# Patient Record
Sex: MALE | NOT HISPANIC OR LATINO | URBAN - METROPOLITAN AREA
[De-identification: names, ages, dates, MRNs, and addresses within clinical notes are randomized per-mention and may not be internally consistent; named-entity substitution may affect disease eponyms.]

---

## 2018-09-11 ENCOUNTER — INPATIENT (INPATIENT)
Facility: HOSPITAL | Age: 48
LOS: 0 days | Discharge: HOME | DRG: 389 | End: 2018-09-12
Attending: SURGERY | Admitting: SURGERY
Payer: MEDICAID

## 2018-09-11 VITALS
RESPIRATION RATE: 16 BRPM | HEIGHT: 65 IN | OXYGEN SATURATION: 97 % | HEART RATE: 74 BPM | WEIGHT: 134.92 LBS | SYSTOLIC BLOOD PRESSURE: 128 MMHG | DIASTOLIC BLOOD PRESSURE: 85 MMHG | TEMPERATURE: 98 F

## 2018-09-11 DIAGNOSIS — K56.609 UNSPECIFIED INTESTINAL OBSTRUCTION, UNSPECIFIED AS TO PARTIAL VERSUS COMPLETE OBSTRUCTION: ICD-10-CM

## 2018-09-11 LAB
ALBUMIN SERPL ELPH-MCNC: 3.5 G/DL — SIGNIFICANT CHANGE UP (ref 3.5–5)
ALP SERPL-CCNC: 104 U/L — SIGNIFICANT CHANGE UP (ref 40–120)
ALT FLD-CCNC: 62 U/L DA — HIGH (ref 10–60)
ANION GAP SERPL CALC-SCNC: 9 MMOL/L — SIGNIFICANT CHANGE UP (ref 5–17)
AST SERPL-CCNC: 61 U/L — HIGH (ref 10–40)
BASOPHILS # BLD AUTO: 0.1 K/UL — SIGNIFICANT CHANGE UP (ref 0–0.2)
BASOPHILS NFR BLD AUTO: 0.9 % — SIGNIFICANT CHANGE UP (ref 0–2)
BILIRUB SERPL-MCNC: 0.9 MG/DL — SIGNIFICANT CHANGE UP (ref 0.2–1.2)
BUN SERPL-MCNC: 24 MG/DL — HIGH (ref 7–18)
CALCIUM SERPL-MCNC: 8 MG/DL — LOW (ref 8.4–10.5)
CHLORIDE SERPL-SCNC: 98 MMOL/L — SIGNIFICANT CHANGE UP (ref 96–108)
CO2 SERPL-SCNC: 26 MMOL/L — SIGNIFICANT CHANGE UP (ref 22–31)
CREAT SERPL-MCNC: 0.82 MG/DL — SIGNIFICANT CHANGE UP (ref 0.5–1.3)
EOSINOPHIL # BLD AUTO: 0.1 K/UL — SIGNIFICANT CHANGE UP (ref 0–0.5)
EOSINOPHIL NFR BLD AUTO: 1.3 % — SIGNIFICANT CHANGE UP (ref 0–6)
GLUCOSE SERPL-MCNC: 138 MG/DL — HIGH (ref 70–99)
HCT VFR BLD CALC: 39.7 % — SIGNIFICANT CHANGE UP (ref 39–50)
HGB BLD-MCNC: 13.3 G/DL — SIGNIFICANT CHANGE UP (ref 13–17)
LIDOCAIN IGE QN: 803 U/L — HIGH (ref 73–393)
LYMPHOCYTES # BLD AUTO: 1 K/UL — SIGNIFICANT CHANGE UP (ref 1–3.3)
LYMPHOCYTES # BLD AUTO: 12.8 % — LOW (ref 13–44)
MCHC RBC-ENTMCNC: 31.8 PG — SIGNIFICANT CHANGE UP (ref 27–34)
MCHC RBC-ENTMCNC: 33.4 GM/DL — SIGNIFICANT CHANGE UP (ref 32–36)
MCV RBC AUTO: 95.2 FL — SIGNIFICANT CHANGE UP (ref 80–100)
MONOCYTES # BLD AUTO: 0.5 K/UL — SIGNIFICANT CHANGE UP (ref 0–0.9)
MONOCYTES NFR BLD AUTO: 7 % — SIGNIFICANT CHANGE UP (ref 2–14)
NEUTROPHILS # BLD AUTO: 6.1 K/UL — SIGNIFICANT CHANGE UP (ref 1.8–7.4)
NEUTROPHILS NFR BLD AUTO: 77.9 % — HIGH (ref 43–77)
PLATELET # BLD AUTO: 130 K/UL — LOW (ref 150–400)
POTASSIUM SERPL-MCNC: 3.3 MMOL/L — LOW (ref 3.5–5.3)
POTASSIUM SERPL-SCNC: 3.3 MMOL/L — LOW (ref 3.5–5.3)
PROT SERPL-MCNC: 7.1 G/DL — SIGNIFICANT CHANGE UP (ref 6–8.3)
RBC # BLD: 4.16 M/UL — LOW (ref 4.2–5.8)
RBC # FLD: 12.3 % — SIGNIFICANT CHANGE UP (ref 10.3–14.5)
SODIUM SERPL-SCNC: 133 MMOL/L — LOW (ref 135–145)
WBC # BLD: 7.8 K/UL — SIGNIFICANT CHANGE UP (ref 3.8–10.5)
WBC # FLD AUTO: 7.8 K/UL — SIGNIFICANT CHANGE UP (ref 3.8–10.5)

## 2018-09-11 PROCEDURE — 99285 EMERGENCY DEPT VISIT HI MDM: CPT | Mod: 25

## 2018-09-11 PROCEDURE — 99222 1ST HOSP IP/OBS MODERATE 55: CPT | Mod: AI

## 2018-09-11 PROCEDURE — 74177 CT ABD & PELVIS W/CONTRAST: CPT | Mod: 26

## 2018-09-11 PROCEDURE — 71045 X-RAY EXAM CHEST 1 VIEW: CPT | Mod: 26

## 2018-09-11 RX ORDER — NICOTINE POLACRILEX 2 MG
1 GUM BUCCAL DAILY
Qty: 0 | Refills: 0 | Status: DISCONTINUED | OUTPATIENT
Start: 2018-09-11 | End: 2018-09-12

## 2018-09-11 RX ORDER — ONDANSETRON 8 MG/1
4 TABLET, FILM COATED ORAL ONCE
Qty: 0 | Refills: 0 | Status: COMPLETED | OUTPATIENT
Start: 2018-09-11 | End: 2018-09-11

## 2018-09-11 RX ORDER — BENZOCAINE AND MENTHOL 5; 1 G/100ML; G/100ML
1 LIQUID ORAL EVERY 4 HOURS
Qty: 0 | Refills: 0 | Status: DISCONTINUED | OUTPATIENT
Start: 2018-09-11 | End: 2018-09-12

## 2018-09-11 RX ORDER — MORPHINE SULFATE 50 MG/1
2 CAPSULE, EXTENDED RELEASE ORAL EVERY 4 HOURS
Qty: 0 | Refills: 0 | Status: DISCONTINUED | OUTPATIENT
Start: 2018-09-11 | End: 2018-09-12

## 2018-09-11 RX ORDER — SODIUM CHLORIDE 9 MG/ML
1000 INJECTION INTRAMUSCULAR; INTRAVENOUS; SUBCUTANEOUS ONCE
Qty: 0 | Refills: 0 | Status: COMPLETED | OUTPATIENT
Start: 2018-09-11 | End: 2018-09-11

## 2018-09-11 RX ORDER — DEXTROSE MONOHYDRATE, SODIUM CHLORIDE, AND POTASSIUM CHLORIDE 50; .745; 4.5 G/1000ML; G/1000ML; G/1000ML
1000 INJECTION, SOLUTION INTRAVENOUS
Qty: 0 | Refills: 0 | Status: DISCONTINUED | OUTPATIENT
Start: 2018-09-11 | End: 2018-09-12

## 2018-09-11 RX ORDER — SODIUM CHLORIDE 9 MG/ML
3 INJECTION INTRAMUSCULAR; INTRAVENOUS; SUBCUTANEOUS ONCE
Qty: 0 | Refills: 0 | Status: COMPLETED | OUTPATIENT
Start: 2018-09-11 | End: 2018-09-11

## 2018-09-11 RX ORDER — MORPHINE SULFATE 50 MG/1
4 CAPSULE, EXTENDED RELEASE ORAL ONCE
Qty: 0 | Refills: 0 | Status: DISCONTINUED | OUTPATIENT
Start: 2018-09-11 | End: 2018-09-11

## 2018-09-11 RX ORDER — MORPHINE SULFATE 50 MG/1
4 CAPSULE, EXTENDED RELEASE ORAL EVERY 4 HOURS
Qty: 0 | Refills: 0 | Status: DISCONTINUED | OUTPATIENT
Start: 2018-09-11 | End: 2018-09-12

## 2018-09-11 RX ORDER — HEPARIN SODIUM 5000 [USP'U]/ML
5000 INJECTION INTRAVENOUS; SUBCUTANEOUS EVERY 8 HOURS
Qty: 0 | Refills: 0 | Status: DISCONTINUED | OUTPATIENT
Start: 2018-09-11 | End: 2018-09-12

## 2018-09-11 RX ORDER — SODIUM CHLORIDE 9 MG/ML
1000 INJECTION, SOLUTION INTRAVENOUS
Qty: 0 | Refills: 0 | Status: DISCONTINUED | OUTPATIENT
Start: 2018-09-11 | End: 2018-09-12

## 2018-09-11 RX ORDER — KETOROLAC TROMETHAMINE 30 MG/ML
30 SYRINGE (ML) INJECTION EVERY 6 HOURS
Qty: 0 | Refills: 0 | Status: DISCONTINUED | OUTPATIENT
Start: 2018-09-11 | End: 2018-09-12

## 2018-09-11 RX ORDER — ONDANSETRON 8 MG/1
4 TABLET, FILM COATED ORAL EVERY 6 HOURS
Qty: 0 | Refills: 0 | Status: DISCONTINUED | OUTPATIENT
Start: 2018-09-11 | End: 2018-09-12

## 2018-09-11 RX ADMIN — MORPHINE SULFATE 4 MILLIGRAM(S): 50 CAPSULE, EXTENDED RELEASE ORAL at 10:13

## 2018-09-11 RX ADMIN — MORPHINE SULFATE 4 MILLIGRAM(S): 50 CAPSULE, EXTENDED RELEASE ORAL at 05:31

## 2018-09-11 RX ADMIN — HEPARIN SODIUM 5000 UNIT(S): 5000 INJECTION INTRAVENOUS; SUBCUTANEOUS at 13:18

## 2018-09-11 RX ADMIN — ONDANSETRON 4 MILLIGRAM(S): 8 TABLET, FILM COATED ORAL at 02:12

## 2018-09-11 RX ADMIN — MORPHINE SULFATE 4 MILLIGRAM(S): 50 CAPSULE, EXTENDED RELEASE ORAL at 09:57

## 2018-09-11 RX ADMIN — BENZOCAINE AND MENTHOL 1 LOZENGE: 5; 1 LIQUID ORAL at 11:55

## 2018-09-11 RX ADMIN — MORPHINE SULFATE 2 MILLIGRAM(S): 50 CAPSULE, EXTENDED RELEASE ORAL at 13:16

## 2018-09-11 RX ADMIN — ONDANSETRON 4 MILLIGRAM(S): 8 TABLET, FILM COATED ORAL at 09:37

## 2018-09-11 RX ADMIN — MORPHINE SULFATE 4 MILLIGRAM(S): 50 CAPSULE, EXTENDED RELEASE ORAL at 06:52

## 2018-09-11 RX ADMIN — MORPHINE SULFATE 4 MILLIGRAM(S): 50 CAPSULE, EXTENDED RELEASE ORAL at 02:31

## 2018-09-11 RX ADMIN — DEXTROSE MONOHYDRATE, SODIUM CHLORIDE, AND POTASSIUM CHLORIDE 125 MILLILITER(S): 50; .745; 4.5 INJECTION, SOLUTION INTRAVENOUS at 09:38

## 2018-09-11 RX ADMIN — Medication 1 PATCH: at 11:55

## 2018-09-11 RX ADMIN — Medication 30 MILLIGRAM(S): at 16:45

## 2018-09-11 RX ADMIN — SODIUM CHLORIDE 1000 MILLILITER(S): 9 INJECTION INTRAMUSCULAR; INTRAVENOUS; SUBCUTANEOUS at 02:03

## 2018-09-11 RX ADMIN — SODIUM CHLORIDE 1000 MILLILITER(S): 9 INJECTION INTRAMUSCULAR; INTRAVENOUS; SUBCUTANEOUS at 03:39

## 2018-09-11 RX ADMIN — MORPHINE SULFATE 4 MILLIGRAM(S): 50 CAPSULE, EXTENDED RELEASE ORAL at 03:39

## 2018-09-11 RX ADMIN — Medication 30 MILLIGRAM(S): at 22:58

## 2018-09-11 RX ADMIN — Medication 30 MILLIGRAM(S): at 22:47

## 2018-09-11 RX ADMIN — Medication 30 MILLIGRAM(S): at 16:32

## 2018-09-11 RX ADMIN — MORPHINE SULFATE 2 MILLIGRAM(S): 50 CAPSULE, EXTENDED RELEASE ORAL at 13:31

## 2018-09-11 RX ADMIN — SODIUM CHLORIDE 150 MILLILITER(S): 9 INJECTION, SOLUTION INTRAVENOUS at 11:54

## 2018-09-11 RX ADMIN — SODIUM CHLORIDE 3 MILLILITER(S): 9 INJECTION INTRAMUSCULAR; INTRAVENOUS; SUBCUTANEOUS at 02:03

## 2018-09-11 NOTE — ED ADULT NURSE NOTE - OBJECTIVE STATEMENT
48 years old male presents to ED c/o persistent abdominal pains and nausea from last night. P/S 9-10/10. Patient stated he has history of Pancreatitis. ED physician is aware patient.

## 2018-09-11 NOTE — ED PROVIDER NOTE - OBJECTIVE STATEMENT
49 y/o w/ PMHx of alcohol abuse and pancreatitis presents to ED w/ c/o upper abd pain. Pt reports that pain onset tonight after having a large dinner. Pt describes having similar mild epigastric pain 2 days ago, that had resolved on its own. After dinner tonight, pt states pain worsened, associated w/ nausea. Denies any vomiting, fever, diarrhea, or any other complaints. Notes that symptoms are similar to the time he was diagnosed w/ necrotizing pancreatitis at [facility], which was related to EtOH abuse.

## 2018-09-11 NOTE — ED PROVIDER NOTE - MEDICAL DECISION MAKING DETAILS
49 y/o w/ PMHx of EtOH related pancreatitis, w/ c/o mid epigastric abd pain. Will check labs, CT-scan of abd, given morphine for pain, IVF and Zofran.

## 2018-09-11 NOTE — ED ADULT TRIAGE NOTE - CHIEF COMPLAINT QUOTE
c/o diffused abdominal pain since 9 pm, + nausea, denies any vomiting, no diarrhea.  Pt states he was diagnosed with acute pancreatitis a year ago & he has the same symptoms as tonight. Pt took Tylenol # 3 & ASA 45 mins prior to coming to ED

## 2018-09-11 NOTE — ED ADULT NURSE NOTE - ED STAT RN HANDOFF DETAILS
handover report given to Nurse Shraddha. Patient awaiting Thomson RN accept. NG tube on low suction.

## 2018-09-11 NOTE — ED ADULT NURSE NOTE - NSIMPLEMENTINTERV_GEN_ALL_ED
Implemented All Universal Safety Interventions:  [place] to call system. Call bell, personal items and telephone within reach. Instruct patient to call for assistance. Room bathroom lighting operational. Non-slip footwear when patient is off stretcher. Physically safe environment: no spills, clutter or unnecessary equipment. Stretcher in lowest position, wheels locked, appropriate side rails in place.

## 2018-09-11 NOTE — ED PROVIDER NOTE - CARE PLAN
Principal Discharge DX:	Small bowel obstruction  Secondary Diagnosis:	Abdominal mass, left upper quadrant

## 2018-09-11 NOTE — H&P ADULT - ATTENDING COMMENTS
Agree with above  Patient has a history of significant alcohol abuse - binge drinking, with several episodes of pancreatitis including necrotizing pancreatis, for which he was hospitalized at [facility] last year. He is known to have a pancreatic mass which last was imaged about a year ago. He never saw a surgeon for this.  He now comes in with abd pain, upper abdomen, with vomiting.  Abd is nondistended, tender in upper abdomen  NG in place - placed in ER, confirmed by xray  CT reviewed - 6cm mass in distal pancreas vs stomach wall, ?malignancy  Heavy etoh use and tobacco use    - nicotine patch  - NPO  - NG tube  - MRI pancreas protocol  - GI consult  - tumor markers  - cepacol for throat pain  - banana bag  - social work consult

## 2018-09-11 NOTE — H&P ADULT - NSHPPHYSICALEXAM_GEN_ALL_CORE
NAD  alert, oriented x3  NGT in place  S1S2  good air entry b/l  abd softly distended, NT at this time  ext no c/c/e

## 2018-09-11 NOTE — H&P ADULT - HISTORY OF PRESENT ILLNESS
49 y/o male denies sig PMH or PSH  was diagnosed with abd mass about 1 year ago at [facility] per pt  he was having abd pain at that time  states he followed up with a surgeon but had followed mass with serial imaging  pt admits to binge drinking about once a month; last episode was Friday    comes to ED with c/o abd pain and attempt at self-induced vomiting  hasn't had bowel function in several days  no f/c    < from: CT Abdomen and Pelvis w/ IV Cont (09.11.18 @ 04:18) >  Lower Thorax: No consolidation or effusion.        Liver: No suspicious lesions. Hepatic steatosis.  Biliary: No dilatation. No calcified gallstones identified.  Spleen: No suspicious lesions.      Pancreas: No inflammatory changes or ductal dilatation. There is a   complex, predominantly low-attenuation multilobulated left upper quadrant   mass measuring 6 x 5.2 x 5.6 cm (craniocaudal by transverse by AP   dimension) at the level of the tail of pancreas extending to posterior   body of the gastric region. Source indeterminate, possibility of   pancreatic or gastric malignancy considered. Correlate with prior   studies, consider MR evaluation provided no MR contraindications. Upper   endoscopy also recommended for further gastric evaluation.  Adrenals: Normal.      Kidneys: No hydronephrosis or solid mass.   Vessels: Normal aortic caliber. MIld atherosclerotic disease of the aorta   and its branches. Multiple collateral vessels noted in the left upper   quadrant.    GI tract: Multiple dilated fluid-filled small bowel loops with segment of   jejunal fold apposing each other in midabdomen with suggestion of at   least two transitional points (image 36 of series 602) in right side of   abdomen and (images 79-81 of series 2) in left side of abdomen. The   findings are nonspecific, possibility of closed loop obstruction or   internal hernia considered in the differential. Surgical consultation and   short-term imaging follow-up is advised. No mesenteric edema or interloop   fluid to suggest ischemia at this time. Correlate with lactic acid   levels. Fecalization of the involved small bowel loops also identified.   Mild diverticulosis. Normal appendix.    Peritoneum/retroperitoneum and mesentery: No free air. No organized fluid   collection. No adenopathy.        Pelvic organs/Bladder: Heterogeneous prostate containing central   calcifications. Bladder is normal.        Abdominal wall: Unremarkable.  Bones and soft tissues: Mild multilevel degenerative changes of the spine   are noted.        IMPRESSION:    Multiple dilated fluid-filled small bowel loops with segment of jejunal   fold apposing each other in midabdomen with suggestion of at least two   transitional points as described. The findings are nonspecific,   possibility of closed loop obstruction or internal hernia considered in   the differential. Surgical consultation and short-term imaging follow-up   is advised. No mesenteric edema or interloop fluid to suggest ischemia at   this time. Correlate with lactic acid levels. No peripancreatic stranding   identified to suggest acute pancreatitis at this time.    Complex, predominantly low-attenuation multilobulated left upper quadrant   mass measuring 6 x 5.2 x 5.6 cm (craniocaudal by transverse by AP   dimension) at the level of the tail of pancreas extending to to posterior   body of the gastric region. Source indeterminate, possibility of   pancreatic or gastric malignancy considered. Correlate with prior   studies, consider MR evaluation provided no MR contraindications. Upper   endoscopy also recommended for further gastric evaluation.    < end of copied text > 49 y/o male denies sig PMH or PSH  was diagnosed with abd mass about 1 year ago at [facility] per pt  he was having abd pain at that time  states he followed up with a surgeon but had followed mass with serial imaging  pt admits to binge drinking about once a month; last episode was Friday. He also smokes.    comes to ED with c/o abd pain and attempt at self-induced vomiting  hasn't had bowel function in several days  no f/c    < from: CT Abdomen and Pelvis w/ IV Cont (09.11.18 @ 04:18) >  Lower Thorax: No consolidation or effusion.        Liver: No suspicious lesions. Hepatic steatosis.  Biliary: No dilatation. No calcified gallstones identified.  Spleen: No suspicious lesions.      Pancreas: No inflammatory changes or ductal dilatation. There is a   complex, predominantly low-attenuation multilobulated left upper quadrant   mass measuring 6 x 5.2 x 5.6 cm (craniocaudal by transverse by AP   dimension) at the level of the tail of pancreas extending to posterior   body of the gastric region. Source indeterminate, possibility of   pancreatic or gastric malignancy considered. Correlate with prior   studies, consider MR evaluation provided no MR contraindications. Upper   endoscopy also recommended for further gastric evaluation.  Adrenals: Normal.      Kidneys: No hydronephrosis or solid mass.   Vessels: Normal aortic caliber. MIld atherosclerotic disease of the aorta   and its branches. Multiple collateral vessels noted in the left upper   quadrant.    GI tract: Multiple dilated fluid-filled small bowel loops with segment of   jejunal fold apposing each other in midabdomen with suggestion of at   least two transitional points (image 36 of series 602) in right side of   abdomen and (images 79-81 of series 2) in left side of abdomen. The   findings are nonspecific, possibility of closed loop obstruction or   internal hernia considered in the differential. Surgical consultation and   short-term imaging follow-up is advised. No mesenteric edema or interloop   fluid to suggest ischemia at this time. Correlate with lactic acid   levels. Fecalization of the involved small bowel loops also identified.   Mild diverticulosis. Normal appendix.    Peritoneum/retroperitoneum and mesentery: No free air. No organized fluid   collection. No adenopathy.        Pelvic organs/Bladder: Heterogeneous prostate containing central   calcifications. Bladder is normal.        Abdominal wall: Unremarkable.  Bones and soft tissues: Mild multilevel degenerative changes of the spine   are noted.        IMPRESSION:    Multiple dilated fluid-filled small bowel loops with segment of jejunal   fold apposing each other in midabdomen with suggestion of at least two   transitional points as described. The findings are nonspecific,   possibility of closed loop obstruction or internal hernia considered in   the differential. Surgical consultation and short-term imaging follow-up   is advised. No mesenteric edema or interloop fluid to suggest ischemia at   this time. Correlate with lactic acid levels. No peripancreatic stranding   identified to suggest acute pancreatitis at this time.    Complex, predominantly low-attenuation multilobulated left upper quadrant   mass measuring 6 x 5.2 x 5.6 cm (craniocaudal by transverse by AP   dimension) at the level of the tail of pancreas extending to to posterior   body of the gastric region. Source indeterminate, possibility of   pancreatic or gastric malignancy considered. Correlate with prior   studies, consider MR evaluation provided no MR contraindications. Upper   endoscopy also recommended for further gastric evaluation.    < end of copied text >

## 2018-09-11 NOTE — ED PROVIDER NOTE - PROGRESS NOTE DETAILS
labs show lipase 803, CT shows LUQ mass [id number] and multiple dilated small bowel loops concerning for closed loop obstruction vs internal hernia  Discussed above with patient who reports mass is known to him. patient agrees to NGtube placement.   Dr. Man from surgical service consulted for admission, recommends admission under Dr. Nieves.

## 2018-09-12 VITALS
HEART RATE: 94 BPM | TEMPERATURE: 98 F | DIASTOLIC BLOOD PRESSURE: 87 MMHG | OXYGEN SATURATION: 99 % | SYSTOLIC BLOOD PRESSURE: 134 MMHG | RESPIRATION RATE: 16 BRPM

## 2018-09-12 DIAGNOSIS — E87.6 HYPOKALEMIA: ICD-10-CM

## 2018-09-12 DIAGNOSIS — K85.91 ACUTE PANCREATITIS WITH UNINFECTED NECROSIS, UNSPECIFIED: ICD-10-CM

## 2018-09-12 LAB
ALBUMIN SERPL ELPH-MCNC: 3.1 G/DL — LOW (ref 3.5–5)
ALP SERPL-CCNC: 89 U/L — SIGNIFICANT CHANGE UP (ref 40–120)
ALT FLD-CCNC: 44 U/L DA — SIGNIFICANT CHANGE UP (ref 10–60)
ANION GAP SERPL CALC-SCNC: 9 MMOL/L — SIGNIFICANT CHANGE UP (ref 5–17)
AST SERPL-CCNC: 31 U/L — SIGNIFICANT CHANGE UP (ref 10–40)
BILIRUB SERPL-MCNC: 0.8 MG/DL — SIGNIFICANT CHANGE UP (ref 0.2–1.2)
BUN SERPL-MCNC: 7 MG/DL — SIGNIFICANT CHANGE UP (ref 7–18)
CALCIUM SERPL-MCNC: 8.4 MG/DL — SIGNIFICANT CHANGE UP (ref 8.4–10.5)
CANCER AG125 SERPL-ACNC: 10 U/ML — SIGNIFICANT CHANGE UP
CANCER AG19-9 SERPL-ACNC: 38.4 U/ML — SIGNIFICANT CHANGE UP
CEA SERPL-MCNC: 4.2 NG/ML — HIGH (ref 0–3.8)
CHLORIDE SERPL-SCNC: 104 MMOL/L — SIGNIFICANT CHANGE UP (ref 96–108)
CO2 SERPL-SCNC: 25 MMOL/L — SIGNIFICANT CHANGE UP (ref 22–31)
CREAT SERPL-MCNC: 0.61 MG/DL — SIGNIFICANT CHANGE UP (ref 0.5–1.3)
GLUCOSE SERPL-MCNC: 123 MG/DL — HIGH (ref 70–99)
HCT VFR BLD CALC: 37.9 % — LOW (ref 39–50)
HGB BLD-MCNC: 12.7 G/DL — LOW (ref 13–17)
MCHC RBC-ENTMCNC: 32.1 PG — SIGNIFICANT CHANGE UP (ref 27–34)
MCHC RBC-ENTMCNC: 33.5 GM/DL — SIGNIFICANT CHANGE UP (ref 32–36)
MCV RBC AUTO: 95.7 FL — SIGNIFICANT CHANGE UP (ref 80–100)
PLATELET # BLD AUTO: 106 K/UL — LOW (ref 150–400)
POTASSIUM SERPL-MCNC: 3.2 MMOL/L — LOW (ref 3.5–5.3)
POTASSIUM SERPL-SCNC: 3.2 MMOL/L — LOW (ref 3.5–5.3)
PROT SERPL-MCNC: 6.5 G/DL — SIGNIFICANT CHANGE UP (ref 6–8.3)
RBC # BLD: 3.96 M/UL — LOW (ref 4.2–5.8)
RBC # FLD: 12.8 % — SIGNIFICANT CHANGE UP (ref 10.3–14.5)
SODIUM SERPL-SCNC: 138 MMOL/L — SIGNIFICANT CHANGE UP (ref 135–145)
WBC # BLD: 6.2 K/UL — SIGNIFICANT CHANGE UP (ref 3.8–10.5)
WBC # FLD AUTO: 6.2 K/UL — SIGNIFICANT CHANGE UP (ref 3.8–10.5)

## 2018-09-12 PROCEDURE — 99232 SBSQ HOSP IP/OBS MODERATE 35: CPT

## 2018-09-12 PROCEDURE — 99254 IP/OBS CNSLTJ NEW/EST MOD 60: CPT

## 2018-09-12 RX ORDER — POTASSIUM CHLORIDE 20 MEQ
40 PACKET (EA) ORAL ONCE
Qty: 0 | Refills: 0 | Status: COMPLETED | OUTPATIENT
Start: 2018-09-12 | End: 2018-09-12

## 2018-09-12 RX ADMIN — HEPARIN SODIUM 5000 UNIT(S): 5000 INJECTION INTRAVENOUS; SUBCUTANEOUS at 15:18

## 2018-09-12 RX ADMIN — Medication 1 PATCH: at 12:10

## 2018-09-12 RX ADMIN — Medication 40 MILLIEQUIVALENT(S): at 12:09

## 2018-09-12 NOTE — DISCHARGE NOTE ADULT - CARE PROVIDER_API CALL
Brennan Rojas), Gastroenterology; Internal Medicine  [address] [place] [id number]  Phone: [phone]  Fax: [phone]

## 2018-09-12 NOTE — DISCHARGE NOTE ADULT - CARE PLAN
Principal Discharge DX:	Small bowel obstruction  Goal:	Now resolved  Assessment and plan of treatment:	Diet and activity as tolerated  Secondary Diagnosis:	Abdominal mass, left upper quadrant  Goal:	Please follow up with Dr. Kong for EUS and biopsy

## 2018-09-12 NOTE — CONSULT NOTE ADULT - ASSESSMENT
48 year old alcoholic with recurrent pancreatitis and pancreatic mass.     Plan:  After reviewing hte images I am unclear if his pancreatic mass is complex pancreatitic c cyst versus a mass. He should be evaluated with EUS-FNA. Clinically he is stable and this can be done as an outpatient.   Plan:  Advance diet   ETOH cessation discussed  Follow up outpatient for EUS- FNA

## 2018-09-12 NOTE — DISCHARGE NOTE ADULT - HOSPITAL COURSE
49 y/o male denies sig PMH or PSH  was diagnosed with abd mass about 1 year ago at [facility] per pt  he was having abd pain at that time  states he followed up with a surgeon but had followed mass with serial imaging  pt admits to binge drinking about once a month; last episode was Friday. He also smokes.    comes to ED with c/o abd pain and attempt at self-induced vomiting  hasn't had bowel function in several days  no f/c  Patient was found to have SBO and mass on pancreatic tail. Patient was managed conservatively. Patient clinically improved, return of bowel function. Diet was advanced and tolerated.   Patient seen by GI and recommended for outpatient EUS and biopsy . Patient for d/c home

## 2018-09-12 NOTE — CONSULT NOTE ADULT - SUBJECTIVE AND OBJECTIVE BOX
Patient is a 48y old  Male who presents with a chief complaint of sbo (12 Sep 2018 16:39)    49 y/o male with a known history of "a mass on his pancreas" presents with abdominal pain. He recently was binge drinking. After his last drink he began to feel severe epigastric pain 9/10 with radiation to the back, sharp and was presented other hospital. He has had similar episodes in the past and was treated for recurrent pancreatitis.     REVIEW OF SYSTEMS  Constitutional:   No fever, no fatigue, no pallor, no night sweats, no weight loss.  HEENT:   No eye pain, no vision changes, no icterus, no mouth ulcers.  Respiratory:   No shortness of breath, no cough, no respiratory distress.   Cardiovascular:   No chest pain, no palpitations.   Gastrointestinal: + abdominal pain, + nausea,+ vomiting , no diarrhea no constipation, no hematochezia, no melena.  Skin:   No rashes, no jaundice, no eczema.   Musculoskeletal:   No joint pain, no swelling, no myalgia.   Neurologic:   No headache, no seizure, no weakness.   Genitourinary:   No dysuria, no decreased urine output.  Psychiatric:  No depression, no anxiety,   Endocrine:   No thyroid disease, no diabetes.  Heme/Lymphatic:   No anemia, no blood transfusions, no lymph node enlargement, no bleeding, no bruising.  ___________________________________________________________________________________________  Allergies    penicillin (Hives)    Intolerances      MEDICATIONS  (STANDING):  dextrose 5% + sodium chloride 0.9% with potassium chloride 20 mEq/L 1000 milliLiter(s) (125 mL/Hr) IV Continuous <Continuous>  heparin  Injectable 5000 Unit(s) SubCutaneous every 8 hours  multivitamin/thiamine/folic acid in sodium chloride 0.9% 1000 milliLiter(s) (150 mL/Hr) IV Continuous <Continuous>  nicotine - 21 mG/24Hr(s) Patch 1 patch Transdermal daily    MEDICATIONS  (PRN):  benzocaine 15 mG/menthol 3.6 mG Lozenge 1 Lozenge Oral every 4 hours PRN Sore Throat  ketorolac   Injectable 30 milliGRAM(s) IV Push every 6 hours PRN Moderate Pain (4 - 6)  ondansetron Injectable 4 milliGRAM(s) IV Push every 6 hours PRN Nausea and/or Vomiting      PAST MEDICAL & SURGICAL HISTORY:  Necrotizing pancreatitis  ETOH abuse  No significant past surgical history    FAMILY HISTORY:    Social History: No history of : Tobacco use, IVDA. +EToH  ______________________________________________________________________________________    PHYSICAL EXAM    Daily     Daily   BMI: 22.5 (09-11 @ 01:41)  Change in Weight:  Vital Signs Last 24 Hrs  T(C): 37 (12 Sep 2018 14:05), Max: 37 (11 Sep 2018 21:20)  T(F): 98.6 (12 Sep 2018 14:05), Max: 98.6 (11 Sep 2018 21:20)  HR: 96 (12 Sep 2018 14:05) (74 - 96)  BP: 127/88 (12 Sep 2018 14:05) (115/88 - 128/76)  BP(mean): --  RR: 16 (12 Sep 2018 14:05) (16 - 17)  SpO2: 99% (12 Sep 2018 14:05) (99% - 100%)    General:  Well developed, well nourished, alert and active, no pallor, NAD.  HEENT:    Normal appearance of conjunctiva, ears, nose, lips, oropharynx, and oral mucosa, anicteric.  Neck:  No masses, no asymmetry.  Lymph Nodes:  No lymphadenopathy.   Cardiovascular:  RRR normal S1/S2, no murmur.  Respiratory:  CTA B/L, normal respiratory effort.   Abdominal:   soft, no masses or tenderness, normoactive BS, NT/ND, no HSM.  Extremities:   No clubbing or cyanosis, normal capillary refill, no edema.   Skin:   No rash, jaundice, lesions, eczema.   Musculoskeletal:  No joint swelling, erythema or tenderness.   Neuro: No focal deficits.   Other:   _______________________________________________________________________________________________  Lab Results:                          12.7   6.2   )-----------( 106      ( 12 Sep 2018 06:20 )             37.9     09-12    138  |  104  |  7   ----------------------------<  123<H>  3.2<L>   |  25  |  0.61    Ca    8.4      12 Sep 2018 06:20    TPro  6.5  /  Alb  3.1<L>  /  TBili  0.8  /  DBili  x   /  AST  31  /  ALT  44  /  AlkPhos  89  09-12    LIVER FUNCTIONS - ( 12 Sep 2018 06:20 )  Alb: 3.1 g/dL / Pro: 6.5 g/dL / ALK PHOS: 89 U/L / ALT: 44 U/L DA / AST: 31 U/L / GGT: x                 EXAM:  CT ABDOMEN AND PELVIS IC                            PROCEDURE DATE:  09/11/2018          INTERPRETATION:  CT ABDOMEN AND PELVIS WITH CONTRAST    INDICATION: Mid epigastric pain, evaluate for pancreatitis.    TECHNIQUE: Contrast enhanced CT of the abdomen and pelvis. Images are   reformatted in the sagittal and coronal planes.    92 mL of Omnipaque 350 contrast material was injected IV.    COMPARISON: None.    FINDINGS:    Lower Thorax: No consolidation or effusion.        Liver: No suspicious lesions. Hepatic steatosis.  Biliary: No dilatation. No calcified gallstones identified.  Spleen: No suspicious lesions.      Pancreas: No inflammatory changes or ductal dilatation. There is a   complex, predominantly low-attenuation multilobulated left upper quadrant   mass measuring 6 x 5.2 x 5.6 cm (craniocaudal by transverse by AP   dimension) at the level of the tail of pancreas extending to posterior   body of the gastric region. Source indeterminate, possibility of   pancreatic or gastric malignancy considered. Correlate with prior   studies, consider MR evaluation provided no MR contraindications. Upper   endoscopy also recommended for further gastric evaluation.  Adrenals: Normal.      Kidneys: No hydronephrosis or solid mass.      Vessels: Normal aortic caliber. MIld atherosclerotic disease of the aorta   and its branches. Multiple collateral vessels noted in the left upper   quadrant.    GI tract: Multiple dilated fluid-filled small bowel loops with segment of   jejunal fold apposing each other in midabdomen with suggestion of at   least two transitional points (image 36 of series 602) in right side of   abdomen and (images 79-81 of series 2) in left side of abdomen. The   findings are nonspecific, possibility of closed loop obstruction or   internal hernia considered in the differential. Surgical consultation and   short-term imaging follow-up is advised. No mesenteric edema or interloop   fluid to suggest ischemia at this time. Correlate with lactic acid   levels. Fecalization of the involved small bowel loops also identified.   Mild diverticulosis. Normal appendix.    Peritoneum/retroperitoneum and mesentery: No free air. No organized fluid   collection. No adenopathy.        Pelvic organs/Bladder: Heterogeneous prostate containing central   calcifications. Bladder is normal.        Abdominal wall: Unremarkable.  Bones and soft tissues: Mild multilevel degenerative changes of the spine   are noted.        IMPRESSION:    Multiple dilated fluid-filled small bowel loops with segment of jejunal   fold apposing each other in midabdomen with suggestion of at least two   transitional points as described. The findings are nonspecific,   possibility of closed loop obstruction or internal hernia considered in   the differential. Surgical consultation and short-term imaging follow-up   is advised. No mesenteric edema or interloop fluid to suggest ischemia at   this time. Correlate with lactic acid levels. No peripancreatic stranding   identified to suggest acute pancreatitis at this time.    Complex, predominantly low-attenuation multilobulated left upper quadrant   mass measuring 6 x 5.2 x 5.6 cm (craniocaudal by transverse by AP   dimension) at the level of the tail of pancreas extending to to posterior   body of the gastric region. Source indeterminate, possibility of   pancreatic or gastric malignancy considered. Correlate with prior   studies, consider MR evaluation provided no MR contraindications. Upper   endoscopy also recommended for further gastric evaluation.    These critical results were discussed via telephone at 9/11/2018 4:47 AM   by Dr. Mayo of radiology with Dr. Badillo, institution read-back   verification policy was followed.                       DAYLIN MAYO M.D., ATTENDING RADIOLOGIST  This document has been electronically signed. Sep 11 2018  4:48AM                  Stool Results:          RADIOLOGY RESULTS:    SURGICAL PATHOLOGY:

## 2018-09-12 NOTE — PROGRESS NOTE ADULT - SUBJECTIVE AND OBJECTIVE BOX
INTERVAL HPI/OVERNIGHT EVENTS:  Pt resting comfortably. No acute complaints.   NGT fell out overnight.  +flatus/-BM.   Denies N/V    MEDICATIONS  (STANDING):  dextrose 5% + sodium chloride 0.9% with potassium chloride 20 mEq/L 1000 milliLiter(s) (125 mL/Hr) IV Continuous <Continuous>  heparin  Injectable 5000 Unit(s) SubCutaneous every 8 hours  multivitamin/thiamine/folic acid in sodium chloride 0.9% 1000 milliLiter(s) (150 mL/Hr) IV Continuous <Continuous>  nicotine - 21 mG/24Hr(s) Patch 1 patch Transdermal daily  potassium chloride    Tablet ER 40 milliEquivalent(s) Oral once    MEDICATIONS  (PRN):  benzocaine 15 mG/menthol 3.6 mG Lozenge 1 Lozenge Oral every 4 hours PRN Sore Throat  ketorolac   Injectable 30 milliGRAM(s) IV Push every 6 hours PRN Moderate Pain (4 - 6)  morphine  - Injectable 4 milliGRAM(s) IV Push every 4 hours PRN Severe Pain (7 - 10)  morphine  - Injectable 2 milliGRAM(s) IV Push every 4 hours PRN Moderate Pain (4 - 6)  ondansetron Injectable 4 milliGRAM(s) IV Push every 6 hours PRN Nausea and/or Vomiting      Vital Signs Last 24 Hrs  T(C): 36.9 (12 Sep 2018 06:15), Max: 37 (11 Sep 2018 21:20)  T(F): 98.5 (12 Sep 2018 06:15), Max: 98.6 (11 Sep 2018 21:20)  HR: 96 (12 Sep 2018 06:15) (67 - 96)  BP: 115/88 (12 Sep 2018 06:15) (115/88 - 141/82)  BP(mean): --  RR: 16 (12 Sep 2018 06:15) (16 - 17)  SpO2: 99% (12 Sep 2018 06:15) (98% - 100%)    Physical:  General: A&Ox3. NAD.  Abdomen: Soft nondistended, nontender.    I&O's Detail    11 Sep 2018 07:01  -  12 Sep 2018 07:00  --------------------------------------------------------  IN:  Total IN: 0 mL    OUT:    Nasoenteral Tube: 600 mL  Total OUT: 600 mL    Total NET: -600 mL    LABS:                        12.7   6.2   )-----------( 106      ( 12 Sep 2018 06:20 )             37.9             09-12    138  |  104  |  7   ----------------------------<  123<H>  3.2<L>   |  25  |  0.61    Ca    8.4      12 Sep 2018 06:20    TPro  6.5  /  Alb  3.1<L>  /  TBili  0.8  /  DBili  x   /  AST  31  /  ALT  44  /  AlkPhos  89  09-12    Carcinoembryonic Antigen (09.11.18 @ 23:19)    Carcinoembryonic Antigen: 4.2: METHOD: Roche EIA   The CEA assay should not be used as a cancer screening test. Serum CEA  concentrations should only be used in conjunction with   information available from the clinical evaluation of the patien and  from other diagnostic procedures.   CEA Normal Ranges   _________________   Non-smoker: less than 3.9 ng/mL       Smoker: less than 5.5 ng/mL ng/mL INTERVAL HPI/OVERNIGHT EVENTS:  Pt resting comfortably. No acute complaints.   NGT fell out overnight.  +flatus/-BM.   Denies N/V. He feels hungry.      MEDICATIONS  (STANDING):  dextrose 5% + sodium chloride 0.9% with potassium chloride 20 mEq/L 1000 milliLiter(s) (125 mL/Hr) IV Continuous <Continuous>  heparin  Injectable 5000 Unit(s) SubCutaneous every 8 hours  multivitamin/thiamine/folic acid in sodium chloride 0.9% 1000 milliLiter(s) (150 mL/Hr) IV Continuous <Continuous>  nicotine - 21 mG/24Hr(s) Patch 1 patch Transdermal daily  potassium chloride    Tablet ER 40 milliEquivalent(s) Oral once    MEDICATIONS  (PRN):  benzocaine 15 mG/menthol 3.6 mG Lozenge 1 Lozenge Oral every 4 hours PRN Sore Throat  ketorolac   Injectable 30 milliGRAM(s) IV Push every 6 hours PRN Moderate Pain (4 - 6)  morphine  - Injectable 4 milliGRAM(s) IV Push every 4 hours PRN Severe Pain (7 - 10)  morphine  - Injectable 2 milliGRAM(s) IV Push every 4 hours PRN Moderate Pain (4 - 6)  ondansetron Injectable 4 milliGRAM(s) IV Push every 6 hours PRN Nausea and/or Vomiting      Vital Signs Last 24 Hrs  T(C): 36.9 (12 Sep 2018 06:15), Max: 37 (11 Sep 2018 21:20)  T(F): 98.5 (12 Sep 2018 06:15), Max: 98.6 (11 Sep 2018 21:20)  HR: 96 (12 Sep 2018 06:15) (67 - 96)  BP: 115/88 (12 Sep 2018 06:15) (115/88 - 141/82)  BP(mean): --  RR: 16 (12 Sep 2018 06:15) (16 - 17)  SpO2: 99% (12 Sep 2018 06:15) (98% - 100%)    Physical:  General: A&Ox3. NAD.  Abdomen: Soft nondistended, nontender.  Ext no edema  HEENT no icterus  Derm no jaundice  Psych affect appropriate      I&O's Detail    11 Sep 2018 07:01  -  12 Sep 2018 07:00  --------------------------------------------------------  IN:  Total IN: 0 mL    OUT:    Nasoenteral Tube: 600 mL  Total OUT: 600 mL    Total NET: -600 mL    LABS:                        12.7   6.2   )-----------( 106      ( 12 Sep 2018 06:20 )             37.9             09-12    138  |  104  |  7   ----------------------------<  123<H>  3.2<L>   |  25  |  0.61    Ca    8.4      12 Sep 2018 06:20    TPro  6.5  /  Alb  3.1<L>  /  TBili  0.8  /  DBili  x   /  AST  31  /  ALT  44  /  AlkPhos  89  09-12    Carcinoembryonic Antigen (09.11.18 @ 23:19)    Carcinoembryonic Antigen: 4.2: METHOD: Roche EIA   The CEA assay should not be used as a cancer screening test. Serum CEA  concentrations should only be used in conjunction with   information available from the clinical evaluation of the patien and  from other diagnostic procedures.   CEA Normal Ranges   _________________   Non-smoker: less than 3.9 ng/mL       Smoker: less than 5.5 ng/mL ng/mL

## 2018-09-12 NOTE — PROGRESS NOTE ADULT - PROBLEM SELECTOR PLAN 1
keep NPO for now  IVF  OOB/DVT ppx  poss start diet later today clear liquid diet  IVF  OOB/DVT ppx  poss start regular diet later today

## 2018-09-12 NOTE — PROGRESS NOTE ADULT - ATTENDING COMMENTS
Agree w above  SBO is resolving - abd exam is benign  Pancreatic mass needs further workup    - GI consult  - MRI  - tumor markers  - clear liquid diet

## 2018-09-12 NOTE — DISCHARGE NOTE ADULT - PATIENT PORTAL LINK FT
You can access the [facility] Patient Portal, offered by [facility], by registering with the following website: [email or web address]

## 2018-09-13 PROCEDURE — 96374 THER/PROPH/DIAG INJ IV PUSH: CPT | Mod: XU

## 2018-09-13 PROCEDURE — 86304 IMMUNOASSAY TUMOR CA 125: CPT

## 2018-09-13 PROCEDURE — 82378 CARCINOEMBRYONIC ANTIGEN: CPT

## 2018-09-13 PROCEDURE — 86301 IMMUNOASSAY TUMOR CA 19-9: CPT

## 2018-09-13 PROCEDURE — 80053 COMPREHEN METABOLIC PANEL: CPT

## 2018-09-13 PROCEDURE — 99285 EMERGENCY DEPT VISIT HI MDM: CPT | Mod: 25

## 2018-09-13 PROCEDURE — 74177 CT ABD & PELVIS W/CONTRAST: CPT

## 2018-09-13 PROCEDURE — 83690 ASSAY OF LIPASE: CPT

## 2018-09-13 PROCEDURE — 96375 TX/PRO/DX INJ NEW DRUG ADDON: CPT

## 2018-09-13 PROCEDURE — 85027 COMPLETE CBC AUTOMATED: CPT

## 2018-09-13 PROCEDURE — 71045 X-RAY EXAM CHEST 1 VIEW: CPT

## 2021-03-22 PROBLEM — Z00.00 ENCOUNTER FOR PREVENTIVE HEALTH EXAMINATION: Status: ACTIVE | Noted: 2021-03-22

## 2021-03-22 PROBLEM — K85.91 ACUTE PANCREATITIS WITH UNINFECTED NECROSIS, UNSPECIFIED: Chronic | Status: ACTIVE | Noted: 2018-09-11

## 2021-03-22 PROBLEM — F10.10 ALCOHOL ABUSE, UNCOMPLICATED: Chronic | Status: ACTIVE | Noted: 2018-09-11

## 2021-04-28 ENCOUNTER — APPOINTMENT (OUTPATIENT)
Dept: GASTROENTEROLOGY | Facility: CLINIC | Age: 51
End: 2021-04-28

## 2021-10-27 ENCOUNTER — LABORATORY RESULT (OUTPATIENT)
Age: 51
End: 2021-10-27

## 2021-10-27 ENCOUNTER — APPOINTMENT (OUTPATIENT)
Dept: GASTROENTEROLOGY | Facility: CLINIC | Age: 51
End: 2021-10-27
Payer: MEDICAID

## 2021-10-27 VITALS
TEMPERATURE: 98.2 F | BODY MASS INDEX: 24.11 KG/M2 | HEART RATE: 76 BPM | HEIGHT: 66 IN | OXYGEN SATURATION: 97 % | WEIGHT: 150 LBS | SYSTOLIC BLOOD PRESSURE: 126 MMHG | DIASTOLIC BLOOD PRESSURE: 81 MMHG

## 2021-10-27 DIAGNOSIS — Z87.898 PERSONAL HISTORY OF OTHER SPECIFIED CONDITIONS: ICD-10-CM

## 2021-10-27 DIAGNOSIS — K80.20 CALCULUS OF GALLBLADDER W/OUT CHOLECYSTITIS W/OUT OBSTRUCTION: ICD-10-CM

## 2021-10-27 DIAGNOSIS — I86.4 GASTRIC VARICES: ICD-10-CM

## 2021-10-27 DIAGNOSIS — Z12.11 ENCOUNTER FOR SCREENING FOR MALIGNANT NEOPLASM OF COLON: ICD-10-CM

## 2021-10-27 DIAGNOSIS — K85.90 ACUTE PANCREATITIS WITHOUT NECROSIS OR INFECTION, UNSPECIFIED: ICD-10-CM

## 2021-10-27 DIAGNOSIS — Z87.891 PERSONAL HISTORY OF NICOTINE DEPENDENCE: ICD-10-CM

## 2021-10-27 DIAGNOSIS — F43.9 REACTION TO SEVERE STRESS, UNSPECIFIED: ICD-10-CM

## 2021-10-27 DIAGNOSIS — I82.890 ACUTE EMBOLISM AND THROMBOSIS OF OTHER SPECIFIED VEINS: ICD-10-CM

## 2021-10-27 DIAGNOSIS — Z86.59 PERSONAL HISTORY OF OTHER MENTAL AND BEHAVIORAL DISORDERS: ICD-10-CM

## 2021-10-27 PROCEDURE — 99203 OFFICE O/P NEW LOW 30 MIN: CPT

## 2021-10-27 RX ORDER — POLYETHYLENE GLYCOL 3350 AND ELECTROLYTES WITH LEMON FLAVOR 236; 22.74; 6.74; 5.86; 2.97 G/4L; G/4L; G/4L; G/4L; G/4L
236 POWDER, FOR SOLUTION ORAL
Qty: 1 | Refills: 0 | Status: ACTIVE | COMMUNITY
Start: 2021-10-27 | End: 1900-01-01

## 2021-10-27 RX ORDER — BUPROPION HYDROCHLORIDE 75 MG/1
TABLET, FILM COATED ORAL
Refills: 0 | Status: ACTIVE | COMMUNITY

## 2021-10-27 RX ORDER — LORAZEPAM 2 MG/1
TABLET ORAL
Refills: 0 | Status: ACTIVE | COMMUNITY

## 2021-10-27 NOTE — HISTORY OF PRESENT ILLNESS
[FreeTextEntry1] : 52 yo male pt of my previous practice with a hx of pancreatitis here for evaluation, wanted to f/u fr his pancreas.  Pt with a GI  bleed in March 2021 where he lost consciousness with hematemesis and dark stools.  Pt admitted to [facility] where he  received 2 units PRBCs.  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  Plan was to have a splenectomy but then went to [facility] (Dr. Ervin Nicholson) where splenic vein was stented and gastric varices were embolized by IR in April 2021.  No bleeding since procedure.  Pt also had another upper GI bleed in August 2019 and was admitted to [facility] -- EGD showed an ulcer, never tested for H. pylori at that time.  Experiences "pancreatic pain" when he eats certain foods. No abdominal currently.  Once a month will have abdominal pain -- few hours to a couple days -- EtOH and mangos will induce it.  No N/V/D, no constipation.  No BRBPR, no dark stools.  No weight loss, no change in appetite.  No heartburn or reflux.  Pt is scheduled for a f/u CT scan at [facility] with radiology post stenting.  Otherwise doing well.  Fatty liver but no cirrhosis.  Pt sees a hepatologist (Dr. Marcelino Garcia) at [facility], last saw him in May 2021 -- was told to get blood drawn but wanted pt to cease EtoH consumption for three months.  He has another f/u appt in December 2021.  Pretty convinced that pancreatitis is from EtOH consumption.\par \par EGD at [facility] -- March 2021 -- gastric varices with no intervention performed\par No hx of a colonoscopy.\par \par No famhx of stomach, colon or pancreatic cancer.  No IBD.\par \par Covid Vaccinated\par \par Finance

## 2021-10-27 NOTE — ASSESSMENT
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

## 2021-10-28 LAB
ALBUMIN SERPL ELPH-MCNC: 4.8 G/DL
ALP BLD-CCNC: 89 U/L
ALT SERPL-CCNC: 78 U/L
ANION GAP SERPL CALC-SCNC: 13 MMOL/L
AST SERPL-CCNC: 72 U/L
BASOPHILS # BLD AUTO: 0.04 K/UL
BASOPHILS NFR BLD AUTO: 0.8 %
BILIRUB SERPL-MCNC: 0.5 MG/DL
BUN SERPL-MCNC: 14 MG/DL
CALCIUM SERPL-MCNC: 9.6 MG/DL
CHLORIDE SERPL-SCNC: 105 MMOL/L
CO2 SERPL-SCNC: 23 MMOL/L
CREAT SERPL-MCNC: 0.82 MG/DL
EOSINOPHIL # BLD AUTO: 0.15 K/UL
EOSINOPHIL NFR BLD AUTO: 2.8 %
GLUCOSE SERPL-MCNC: 112 MG/DL
HCT VFR BLD CALC: 42 %
HGB BLD-MCNC: 13.4 G/DL
IMM GRANULOCYTES NFR BLD AUTO: 0.4 %
INR PPP: 0.94 RATIO
LYMPHOCYTES # BLD AUTO: 1.12 K/UL
LYMPHOCYTES NFR BLD AUTO: 21.2 %
MAN DIFF?: NORMAL
MCHC RBC-ENTMCNC: 27.2 PG
MCHC RBC-ENTMCNC: 31.9 GM/DL
MCV RBC AUTO: 85.4 FL
MONOCYTES # BLD AUTO: 0.55 K/UL
MONOCYTES NFR BLD AUTO: 10.4 %
NEUTROPHILS # BLD AUTO: 3.4 K/UL
NEUTROPHILS NFR BLD AUTO: 64.4 %
PLATELET # BLD AUTO: 155 K/UL
POTASSIUM SERPL-SCNC: 4.3 MMOL/L
PROT SERPL-MCNC: 7.7 G/DL
PT BLD: 11.1 SEC
RBC # BLD: 4.92 M/UL
RBC # FLD: 21.3 %
SODIUM SERPL-SCNC: 141 MMOL/L
WBC # FLD AUTO: 5.28 K/UL

## 2024-09-03 ENCOUNTER — APPOINTMENT (OUTPATIENT)
Dept: UROLOGY | Facility: CLINIC | Age: 54
End: 2024-09-03
Payer: COMMERCIAL

## 2024-09-03 VITALS
RESPIRATION RATE: 16 BRPM | OXYGEN SATURATION: 99 % | BODY MASS INDEX: 22.98 KG/M2 | HEART RATE: 73 BPM | SYSTOLIC BLOOD PRESSURE: 124 MMHG | HEIGHT: 66 IN | TEMPERATURE: 97 F | DIASTOLIC BLOOD PRESSURE: 63 MMHG | WEIGHT: 143 LBS

## 2024-09-03 DIAGNOSIS — N50.82 SCROTAL PAIN: ICD-10-CM

## 2024-09-03 DIAGNOSIS — K85.90 ACUTE PANCREATITIS WITHOUT NECROSIS OR INFECTION, UNSPECIFIED: ICD-10-CM

## 2024-09-03 DIAGNOSIS — I86.4 GASTRIC VARICES: ICD-10-CM

## 2024-09-03 DIAGNOSIS — I82.890 ACUTE EMBOLISM AND THROMBOSIS OF OTHER SPECIFIED VEINS: ICD-10-CM

## 2024-09-03 DIAGNOSIS — N52.9 MALE ERECTILE DYSFUNCTION, UNSPECIFIED: ICD-10-CM

## 2024-09-03 PROCEDURE — 99204 OFFICE O/P NEW MOD 45 MIN: CPT

## 2024-09-03 PROCEDURE — G2211 COMPLEX E/M VISIT ADD ON: CPT | Mod: NC

## 2024-09-03 NOTE — LETTER BODY
[FreeTextEntry1] : Hailee Moore MD [address], [place], [place] [id number] [phone]  Dear Dr. Moore,  Reason for visit: Erectile dysfunction. Scrotal discomfort.   This is a 54-year-old gentleman with a history of pancreatitis and gall stones presenting today with erectile dysfunction and scrotal discomfort. Patient is referred for evaluation of his condition. Patient reports normal libido and sex drive. However, he notes decreased penile tumescence. His testosterone is normal. Patient does not smoke. Patient also reports of scrotal discomfort. He also has a history of pancreatitis due to his alcohol use. He reports that he is recovering from it. Patient obtained previous ultrasound which demonstrated evidence of gall stones. He reports that he is considering gall bladder surgery. Patient denies any gross hematuria or dysuria or urinary incontinence. The patient denies any aggravating or relieving factors. The patient denies any interference of function.  The patient is entirely asymptomatic. All other review of systems are negative. He has no cancer in his family medical history. He has no previous surgical history. Past medical history, family history and social history were inquired and were noncontributory to current condition. The patient does not use tobacco or drink alcohol. Medications and allergies were reviewed. He has no known allergies to medication.   On examination, the patient is a well-appearing gentleman in no acute distress. He is alert and oriented follows commands. He has normal mood and affect. He is normocephalic. Neck is supple. Respirations are unlabored. His abdomen is soft and nontender. Bladder is nonpalpable. No CVA tenderness. Neurologically he is grossly intact. No peripheral edema. Skin without gross abnormality. He has normal male external genitalia. Normal meatus. Bilateral testes are descended intrascrotally and normal to palpation. On rectal examination, there is normal sphincter tone. The prostate is clinically benign without focal induration or nodularity. Patient has a left inguinal hernia.  Assessment: Erectile dysfunction. Left inguinal hernia.   I counseled the patient. In terms of his erectile dysfunction, I discussed the various etiologies of erectile dysfunction. His testosterone levels were normal. I recommended that he repeats his male hormone panel with testosterone, free testosterone, estradiol, prolactin, and LH level. I recommended the patient obtain PSA, BMP, and urinalysis today to establish baseline. In terms of his scrotal discomfort, the patient was found to have a left inguinal hernia. I recommended conservative management of his hernia.  Risks and alternatives were discussed. I answered the patient questions. The patient will follow-up as directed and will contact me with any questions or concerns. Thank you for the opportunity to participate in the care of Mr. COLON. I will keep you updated on his progress.  Plan:  Male hormone panel. PSA. BMP. Urinalysis. Conservative management. Follow up in 2 months.

## 2024-09-03 NOTE — PHYSICAL EXAM
normal mood with appropriate affect , normal mood with appropriate affect [Normal Appearance] : normal appearance [Well Groomed] : well groomed [General Appearance - In No Acute Distress] : no acute distress [Edema] : no peripheral edema [Respiration, Rhythm And Depth] : normal respiratory rhythm and effort [Abdomen Soft] : soft [Exaggerated Use Of Accessory Muscles For Inspiration] : no accessory muscle use [Abdomen Tenderness] : non-tender [Costovertebral Angle Tenderness] : no ~M costovertebral angle tenderness [Urinary Bladder Findings] : the bladder was normal on palpation [Normal Station and Gait] : the gait and station were normal for the patient's age [] : no rash [No Focal Deficits] : no focal deficits [Oriented To Time, Place, And Person] : oriented to person, place, and time [Affect] : the affect was normal [Mood] : the mood was normal [No Palpable Adenopathy] : no palpable adenopathy

## 2024-09-03 NOTE — HISTORY OF PRESENT ILLNESS
[FreeTextEntry1] : ED Occasional scrotal pain with no size change, scrotal ultrasound done with [facility] radiology in Aug 2024  Please refer to URO Consult Note.

## 2024-09-03 NOTE — ADDENDUM
[FreeTextEntry1] : Entered by Dalton Stoner, acting as scribe for Dr. Luis Mcclain. The documentation recorded by the scribe accurately reflects the service I personally performed and the decisions made by me.

## 2024-09-03 NOTE — LETTER BODY
[FreeTextEntry1] : Hailee Moore MD [address], [place], [place] [id number] [phone]  Dear Dr. Moore,  Reason for visit: Erectile dysfunction. Scrotal discomfort.   This is a 54-year-old gentleman with a history of pancreatitis and gall stones presenting today with erectile dysfunction and scrotal discomfort. Patient is referred for evaluation of his condition. Patient reports normal libido and sex drive. However, he notes decreased penile tumescence. His testosterone is normal. Patient does not smoke. Patient also reports of scrotal discomfort. He also has a history of pancreatitis due to his alcohol use. He reports that he is recovering from it. Patient obtained previous ultrasound which demonstrated evidence of gall stones. He reports that he is considering gall bladder surgery. Patient denies any gross hematuria or dysuria or urinary incontinence. The patient denies any aggravating or relieving factors. The patient denies any interference of function.  The patient is entirely asymptomatic. All other review of systems are negative. He has no cancer in his family medical history. He has no previous surgical history. Past medical history, family history and social history were inquired and were noncontributory to current condition. The patient does not use tobacco or drink alcohol. Medications and allergies were reviewed. He has no known allergies to medication.   On examination, the patient is a well-appearing gentleman in no acute distress. He is alert and oriented follows commands. He has normal mood and affect. He is normocephalic. Neck is supple. Respirations are unlabored. His abdomen is soft and nontender. Bladder is nonpalpable. No CVA tenderness. Neurologically he is grossly intact. No peripheral edema. Skin without gross abnormality. He has normal male external genitalia. Normal meatus. Bilateral testes are descended intrascrotally and normal to palpation. On rectal examination, there is normal sphincter tone. The prostate is clinically benign without focal induration or nodularity. Patient has a left inguinal hernia.  Assessment: Erectile dysfunction. Left inguinal hernia.   I counseled the patient. In terms of his erectile dysfunction, I discussed the various etiologies of erectile dysfunction. His testosterone levels were normal. I recommended that he repeats his male hormone panel with testosterone, free testosterone, estradiol, prolactin, and LH level. I recommended the patient obtain PSA, BMP, and urinalysis today to establish baseline. In terms of his scrotal discomfort, the patient was found to have a left inguinal hernia. I recommended conservative management of his hernia.  Risks and alternatives were discussed. I answered the patient questions. The patient will follow-up as directed and will contact me with any questions or concerns. Thank you for the opportunity to participate in the care of Mr. COLON. I will keep you updated on his progress.  Plan:  Male hormone panel. PSA. BMP. Urinalysis. Conservative management. Follow up in 2 months.

## 2024-09-03 NOTE — HISTORY OF PRESENT ILLNESS
[FreeTextEntry1] : ED Occasional scrotal pain with no size change, scrotal ultrasound done with [facility] radiology in Aug 2024  Please refer to URO Consult Note.

## 2024-09-06 DIAGNOSIS — N52.9 MALE ERECTILE DYSFUNCTION, UNSPECIFIED: ICD-10-CM

## 2024-09-06 RX ORDER — SILDENAFIL 20 MG/1
20 TABLET ORAL
Qty: 3 | Refills: 3 | Status: ACTIVE | COMMUNITY
Start: 2024-09-06 | End: 1900-01-01

## 2024-10-14 NOTE — ED ADULT NURSE NOTE - PACKS PER DAY
I can place a referral to a neurologist that specializes in autonomic dysfunction.  We typically send to [facility].  It does take a while to get in but we can start this process if the patient is agreeable.   1

## 2025-07-07 NOTE — ED PROVIDER NOTE - CONSTITUTIONAL MANNER
Copied from CRM #[id number]. Topic: Medications - Medication Refill  >> Jul 7, 2025 11:46 AM Karen wrote:  Type:  RX Refill Request    Who Called: PT   Refill or New Rx: REFILL  RX Name and Strength:oxyCODONE-acetaminophen (PERCOCET) 5-325 mg per tablet  How is the patient currently taking it? (ex. 1XDay): Route: Take 1 tablet by mouth every 4 (four) hours as needed for Pain. - Oral  Is this a 30 day or 90 day RX:15   Preferred Pharmacy with phone number:[facility] - [place], [place] - [address] [address]  Phone: [phone] Fax: [phone]        Would the patient rather a call back or a response via [facility]? Call   Best Call Back Number:[phone]  Additional Information:   appropriate for situation